# Patient Record
Sex: MALE | Race: OTHER | HISPANIC OR LATINO | URBAN - METROPOLITAN AREA
[De-identification: names, ages, dates, MRNs, and addresses within clinical notes are randomized per-mention and may not be internally consistent; named-entity substitution may affect disease eponyms.]

---

## 2018-09-04 ENCOUNTER — EMERGENCY (EMERGENCY)
Facility: HOSPITAL | Age: 23
LOS: 1 days | Discharge: HOME | End: 2018-09-04
Admitting: EMERGENCY MEDICINE

## 2018-09-04 VITALS
OXYGEN SATURATION: 99 % | TEMPERATURE: 99 F | HEART RATE: 68 BPM | DIASTOLIC BLOOD PRESSURE: 73 MMHG | RESPIRATION RATE: 18 BRPM | SYSTOLIC BLOOD PRESSURE: 127 MMHG
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
## 2018-09-04 DIAGNOSIS — J45.909 UNSPECIFIED ASTHMA, UNCOMPLICATED: ICD-10-CM

## 2018-09-04 DIAGNOSIS — S92.401A DISPLACED UNSPECIFIED FRACTURE OF RIGHT GREAT TOE, INITIAL ENCOUNTER FOR CLOSED FRACTURE: ICD-10-CM

## 2018-09-04 DIAGNOSIS — M79.676 PAIN IN UNSPECIFIED TOE(S): ICD-10-CM
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
## 2018-09-04 RX ORDER — IBUPROFEN 200 MG
600 TABLET ORAL ONCE
Qty: 0 | Refills: 0 | Status: COMPLETED | OUTPATIENT
Start: 2018-09-04 | End: 2018-09-04

## 2018-09-04 RX ORDER — CEPHALEXIN 500 MG
1 CAPSULE ORAL
Qty: 28 | Refills: 0 | OUTPATIENT
Start: 2018-09-04 | End: 2018-09-10

## 2018-09-04 RX ADMIN — Medication 600 MILLIGRAM(S): at 17:09

## 2018-09-04 NOTE — ED ADULT NURSE NOTE - OBJECTIVE STATEMENT
24 y/o male complaining of right foot big toe pain, pt dropped a hard plate on toe last night. Toe is red, swollen and has a small laceration. Pt has been taking motrin for pain.

## 2018-09-04 NOTE — ED PROCEDURE NOTE - NS ED PERI VASCULAR NEG
no cyanosis of extremity/no paresthesia/fingers/toes warm to touch/no swelling/capillary refill time < 2 seconds

## 2018-09-04 NOTE — ED ADULT TRIAGE NOTE - CHIEF COMPLAINT QUOTE
patient states that he dropped a box of towels on hi right foot yesterday , c/o right 1st and 2nd digit swelling /pain

## 2018-09-04 NOTE — ED PROVIDER NOTE - PHYSICAL EXAMINATION
VITAL SIGNS: I have reviewed nursing notes and confirm.  CONSTITUTIONAL: Well-developed; well-nourished; in no acute distress.  SKIN: Skin exam is warm and dry, <2 sec cap refill, ecchymosis surrounding R 1st toe. 1cm horizontal abrasion with overlying scar. healing well. no warmth no discharge   CARD: RRR, 2+ dp pulses  RESP: No wheezes, rales or rhonchi, speaking in full sentences  ABD: soft non tender.   EXT: FROM of R first toe with pain, TTP of R 1st toe and mild TTP of dorsal aspect of R foot  NEURO: Alert, oriented. Grossly unremarkable. No focal deficits.  PSYCH: Cooperative, appropriate.

## 2018-09-04 NOTE — ED PROVIDER NOTE - NS ED ROS FT
Review of Systems:  CONSTITUTIONAL: no fever  MUSCULOSKELETAL: +R 1st toe pain  NEUROLOGIC: no headache, no numbness/tingling/weakness

## 2018-09-04 NOTE — ED PROVIDER NOTE - OBJECTIVE STATEMENT
24 y/o M, no PMH, presents with R 1st toe pain onset yesterday s/p dropping a box of tiles on his foot yesterday at work. Pt states he noticed his toe was bleeding yesterday and did local wound care at home. Pt noticed increased swelling and bruising to toe today and decided to get checked out. denies fever, chills, gait changes, numbness, tingling, weakness. 22 y/o M, no PMH, presents with R 1st toe pain onset yesterday s/p dropping a box of tiles on his foot yesterday at work. Pt states he noticed his toe was bleeding yesterday and did local wound care at home. Pt noticed increased swelling and bruising to toe today. denies fever, chills, gait changes, numbness, tingling, weakness.

## 2018-09-04 NOTE — ED PROCEDURE NOTE - CPROC ED POST PROC CARE GUIDE1
Verbal/written post procedure instructions were given to patient/caregiver./Instructed patient/caregiver regarding signs and symptoms of infection./Elevate the injured extremity as instructed./Instructed patient/caregiver to follow-up with primary care physician.